# Patient Record
(demographics unavailable — no encounter records)

---

## 2024-10-22 NOTE — HISTORY OF PRESENT ILLNESS
[] : Post Surgical Visit: yes [de-identified] : 10/22/24: 3 weeks s/p left TKA no pain with walking - pain at rest.  No fevers/chills.  Previous doc: 1/23/24: 77F with longstanding hx b/l knee pain (R>L)- has been seeing Dr. Venegas for csi and HA injections for the past ~3 years- most recent b/l csi inj on 12/15. Seen by Dr. Smith on 1/12/24 and told she would benefit from TKA YASMIN- here for second opinion. Taking aleeve with some good relief. Difficulty with stairs. Ambulates with cane. Hx of arthroscopic surgery on the RT knee ~15+ years ago. 4/9/24: 2 weeks s/p right TKA, pain is better than preop.  Home PT.  No fevers/chills. 4/16/24: 3 weeks s/p L TKA. Was doing well, but had episode of increased pain while doing therapy- went to [facility] and is now being treated for cellulitis infection. Sig improvement since then. No fever and no pain in the knee with good ROM. redness and swelling has began to resolve with oral antibitoics. 4/30/24: Completed keflex.  No fevers/chills.  Still some swelling in leg.  A lot of pain after PT yesterday.  No fevers/chills. 5/28/24: 2 months s/p right TKA - had pain with bike at PT last week, otherwise not much pain daily. 6/28/24: 3 months postop min pain, occasional stiffness.  Left knee had worsening pain and had inj last week with relief. 7/26/24: Fell 10 days ago on both knees getting out of swimming pool, prior to this had min pain.   [FreeTextEntry5] : slight swelling.  [de-identified] : hep  [de-identified] : 10/02/24 [de-identified] : RT TKA YASMIN

## 2024-10-22 NOTE — IMAGING
[Bilateral] : knee bilaterally [AP] : anteroposterior [Lateral] : lateral [[place]] : skyline [advanced tricompartmental OA with medial compartment narrowing and varus alignment] : advanced tricompartmental OA with medial compartment narrowing and varus alignment [Components well fixed, in good position] : Components well fixed, in good position

## 2024-10-22 NOTE — DISCUSSION/SUMMARY
[de-identified] : The patient is doing well at this time. The patient will be started on a course of physical therapy. I recommended that the patient works on range of motion at home and was shown how to do this. I encouraged the patient to increase ambulation. The patient can continue to take Tylenol for occasional discomfort. The patient was advised not to do any dental work for the first three months following the surgery. We will see the patient  back for a follow-up for a repeat evaluation. The patient  will call or return earlier for any questions or concerns.  Signs and symptoms of infection reviewed and patient advised to call immediately for redness, fevers, and/or chills.  I saw the patient under the supervision of Dr. Mandujano and followed his plan of care.

## 2024-10-22 NOTE — PHYSICAL EXAM
[Left] : left knee [AP] : anteroposterior [Lateral] : lateral [[place]] : skyline [Components well fixed, in good position] : Components well fixed, in good position [de-identified] : Left knee: inc c/d/i.  ROM 3-95.  Lig stable.  NVI.  Cane.  Right knee: Inc healed.  ROM 0-100.

## 2024-10-22 NOTE — ASSESSMENT
[FreeTextEntry1] : Previous doc: 1/23/24: Pt with adv b/l knee OA. She has exhausted conservative tx options including meds and injections. She is well informed and would like to proceed with R TKA [facility]. She will obtain CT scan [facility] protocol- already has CT appt next week. Answered all pt questions.  4/9/24: 2 weeks postop doing well.  Cont PT.   Preop ROM 0-110. 4/16/24: 3 weeks s/p R TKA. Knee itself is doing well and she has good ROM. Redness and swelling resolving with oral antibiotics- though she is only on day 2. Will begin outpatient PT in a week and continue with antibiotics. Follow up 2 weeks to re eval after she finishes course of antibiotics.  4/30/24: Cellulitis resolved.  No signs of knee infection.  Overall good function of knee.  Some pain after a lot of use at PT yesterday.  Cont PT/HEP, return in 4 weeks.  No issues seen at this time.  She is considering left TKA and will reeval this at next visit. 5/28/24: 2 months postop doing well, some discomfort normal for course.  Cont PT, return in 4 weeks.  Considering left TKA end of the year, will call us when she wants to go forward with this. 6/28/24: 3 months postop min pain, cont HEP.  Left knee had recent inj with another doctor which is doing ok for now but she is considering left TKA in the new year.  7/26/24: Recetn fall - contuision to right knee, no acute injury seen.  Planning for left TKA 10/2/24.  Tramadol and mobic prn for now, too close to surgery for injections at this point.

## 2024-10-31 NOTE — HISTORY OF PRESENT ILLNESS
[Sudden] : sudden [8] : 8 [3] : 3 [Sharp] : sharp [Frequent] : frequent [Meds] : meds [de-identified] : This is Ms. PIPE ARMENTA  a 78 year old female who comes in today complaining of left shoulder pain for a few days with no injury.   h/o adhesive cap - 2020 -tx with csi  [] : no

## 2024-10-31 NOTE — DISCUSSION/SUMMARY
[de-identified] : IAC today L shoulder  PT rx  fu 6-8wks   ----------------------------------------------------------------------------   All relevant imaging studies pertinent to today's visit, including x-rays, MRI's and/or other advanced imaging studies (CT/etc) were independently interpreted and reviewed with the patient as needed. Implications of the studies together with the patient's clinical picture were discussed to formulate a working diagnosis and management options were detailed.   The patient and/or guardian was advised of the diagnosis.  The natural history of the pathology was explained in full. All questions were answered.  The risks and benefits of conservative and interventional treatment alternatives were explained to the patient   The patient and/or guardian was advised if any advanced diagnostic/imaging study (MRI/CT/etc) is ordered to evaluate potential pathology in the affected area(s), they should follow up in the office to review the results of the study and determine further management that may be indicated.   ------------------------------------------------  Large joint corticosteroid injection given: Left shoulder intraarticular  Patient indicated for injection after trial of rest, OTC medications including aspirin, Ibuprofen, Aleve etc or prescription NSAIDS, and/or exercises at home and/ or physical therapy without satisfactory response. Patient has symptoms including pain, swelling, and/or decreased mobility in the joint. The risks, benefits, and alternatives to corticosteroid injection were explained in full to the patient, including but not limited to infection, sepsis, bleeding, scarring, skin discoloration, temporary increase in pain, syncopal episode, failure to resolve symptoms, allergic reaction, symptom recurrence, and elevation of blood sugar in diabetics. Patient understood the risks. All questions were answered. After discussion of options, patient requested an injection.   Oral informed consent was obtained and sterile technique was utilized for the procedure including the preparation of the solutions used for the injection and betadine followed by alcohol prep to the injection site. Anesthesia was given with ethyl chloride sprayed topically. The injection was delivered. Patient tolerated the procedure well.   Post Procedure Instructions: Patient was advised to call if redness, pain, or fever occur and apply ice for 15 min on and 15 min off later today  Medications delivered: Kenalog 10 mg, Lidocaine: 4 cc per joint

## 2024-10-31 NOTE — IMAGING
[Left] : left shoulder [AC Joint Arthrosis] : AC Joint Arthrosis [Glenohumeral arthritis] : Glenohumeral arthritis [There are no fractures, subluxations or dislocations. No significant abnormalities are seen] : There are no fractures, subluxations or dislocations. No significant abnormalities are seen [Type 2 acromion] : Type 2 acromion [de-identified] :   ----------------------------------------------------------------------------   Left shoulder exam:    Skin: no significant pertinent finding  Inspection: no obvious deformity, no obvious masses, no swelling, no effusion, no atrophy  ROM:     FF: 130     ER: 15     IR: psis  Tenderness:     (+) Anterior/Biceps:     (neg) Posterior     (neg) Lateral     (neg) Trapezius     (neg) Scapula     (neg) AC joint     (neg) Crepitus with ROM  Stability:     (neg) Translation     (neg) Apprehension     (neg) Clicking  Additional tests:     (+) Neer's     (+)Hawkin's     (neg) Walker's     (neg) Speed     (neg) Cross chest adduction  Strength:     FF: 5-/5     ER: 5-/5     IR: 5/5     Biceps: 5/5     Triceps: 5/5     Distal: 5/5  Neuro: In tact to light touch throughout  Vascularity: Extremity warm and well perfused

## 2024-11-07 NOTE — DISCUSSION/SUMMARY
[Medication Risks Reviewed] : Medication risks reviewed [de-identified] : Plan for L shoulder IAC CSI with U/S guidance given last inj likely missed  ----------------------------------------------------------------------------   All relevant imaging studies pertinent to today's visit, including x-rays, MRI's and/or other advanced imaging studies (CT/etc) were independently interpreted and reviewed with the patient as needed. Implications of the studies together with the patient's clinical picture were discussed to formulate a working diagnosis and management options were detailed.   The patient and/or guardian was advised of the diagnosis.  The natural history of the pathology was explained in full. All questions were answered.  The risks and benefits of conservative and interventional treatment alternatives were explained to the patient   The patient and/or guardian was advised if any advanced diagnostic/imaging study (MRI/CT/etc) is ordered to evaluate potential pathology in the affected area(s), they should follow up in the office to review the results of the study and determine further management that may be indicated.   ------------------------------------------------ Large joint corticosteroid injection given: Left shoulder intraarticular  Patient indicated for injection after trial of rest, OTC medications including aspirin, Ibuprofen, Aleve etc or prescription NSAIDS, and/or exercises at home and/ or physical therapy without satisfactory response. Patient has symptoms including pain, swelling, and/or decreased mobility in the joint. The risks, benefits, and alternatives to corticosteroid injection were explained in full to the patient, including but not limited to infection, sepsis, bleeding, scarring, skin discoloration, temporary increase in pain, syncopal episode, failure to resolve symptoms, allergic reaction, symptom recurrence, and elevation of blood sugar in diabetics. Patient understood the risks. All questions were answered. After discussion of options, patient requested an injection.  Oral informed consent was obtained and sterile technique was utilized for the procedure including the preparation of the solutions used for the injection and betadine followed by alcohol prep to the injection site. Anesthesia was given with ethyl chloride sprayed topically. The injection was delivered. Patient tolerated the procedure well.  Post Procedure Instructions: Patient was advised to call if redness, pain, or fever occur and apply ice for 15 min on and 15 min off later today  Medications delivered: Kenalog 10 mg, Lidocaine: 4 cc per joint.

## 2024-11-07 NOTE — IMAGING
[Left] : left shoulder [AC Joint Arthrosis] : AC Joint Arthrosis [Glenohumeral arthritis] : Glenohumeral arthritis [There are no fractures, subluxations or dislocations. No significant abnormalities are seen] : There are no fractures, subluxations or dislocations. No significant abnormalities are seen [Type 2 acromion] : Type 2 acromion [de-identified] :   ----------------------------------------------------------------------------   Left shoulder exam:    Skin: no significant pertinent finding  Inspection: no obvious deformity, no obvious masses, no swelling, no effusion, no atrophy  ROM:     FF: 130     ER: 15     IR: psis  Tenderness:     (+) Anterior/Biceps:     (+) Posterior     (neg) Lateral     (neg) Trapezius     (neg) Scapula     (+) AC joint     (neg) Crepitus with ROM  Stability:     (neg) Translation     (neg) Apprehension     (neg) Clicking  Additional tests:     (+) Neer's     (+)Hawkin's     (neg) Walker's     (neg) Speed     (+) Cross chest adduction  Strength:     FF: 5-/5     ER: 5-/5     IR: 5/5     Biceps: 5/5     Triceps: 5/5     Distal: 5/5  Neuro: In tact to light touch throughout  Vascularity: Extremity warm and well perfused

## 2024-11-07 NOTE — HISTORY OF PRESENT ILLNESS
[Sudden] : sudden [6] : 6 [Sharp] : sharp [Frequent] : frequent [Meds] : meds [de-identified] : This is Ms. PIPE ARMENTA  a 78 year old female who comes in today complaining of left shoulder pain for a few days with no injury.   h/o adhesive cap - 2020 -tx with csi   h/o bL TKA [place]  [] : no [de-identified] : physical therapy

## 2024-11-07 NOTE — REASON FOR VISIT
[FreeTextEntry2] : follow up left shoulder- pt expressed min to no relief from prev L shoulder CSI inj

## 2024-11-19 NOTE — PHYSICAL EXAM
[Left] : left knee [AP] : anteroposterior [Lateral] : lateral [[place]] : skyline [Components well fixed, in good position] : Components well fixed, in good position [de-identified] : Left knee: inc c/d/i.  ROM 3-105.  Lig stable.  NVI.  Cane.  Right knee: Inc healed.  ROM 0-100.

## 2024-11-19 NOTE — ASSESSMENT
[FreeTextEntry1] : Previous doc: 1/23/24: Pt with adv b/l knee OA. She has exhausted conservative tx options including meds and injections. She is well informed and would like to proceed with R TKA [facility]. She will obtain CT scan [facility] protocol- already has CT appt next week. Answered all pt questions.  4/9/24: 2 weeks postop doing well.  Cont PT.   Preop ROM 0-110. 4/16/24: 3 weeks s/p R TKA. Knee itself is doing well and she has good ROM. Redness and swelling resolving with oral antibiotics- though she is only on day 2. Will begin outpatient PT in a week and continue with antibiotics. Follow up 2 weeks to re eval after she finishes course of antibiotics.  4/30/24: Cellulitis resolved.  No signs of knee infection.  Overall good function of knee.  Some pain after a lot of use at PT yesterday.  Cont PT/HEP, return in 4 weeks.  No issues seen at this time.  She is considering left TKA and will reeval this at next visit. 5/28/24: 2 months postop doing well, some discomfort normal for course.  Cont PT, return in 4 weeks.  Considering left TKA end of the year, will call us when she wants to go forward with this. 6/28/24: 3 months postop min pain, cont HEP.  Left knee had recent inj with another doctor which is doing ok for now but she is considering left TKA in the new year. 7/26/24: Recetn fall - contuision to right knee, no acute injury seen.  Planning for left TKA 10/2/24.  Tramadol and mobic prn for now, too close to surgery for injections at this point.  11/19/24: 6 weeks s/p left TKA min pain and good function.  She is very happy with outcome.  Cont PT/HEP, return at 1 year postop.

## 2024-11-19 NOTE — DISCUSSION/SUMMARY
[de-identified] : The patient was advised of the diagnosis.  The natural history of the pathology was explained in full to the patient in layman's terms. All questions were answered.  The risks and benefits of surgical and non-surgical treatment alternatives were explained in full to the patient.  I saw the patient under the supervision of Dr. Mandujano and followed his plan of care.

## 2024-11-19 NOTE — HISTORY OF PRESENT ILLNESS
[] : Post Surgical Visit: yes [Dull/Aching] : dull/aching [de-identified] : 11/19/24: 6 weeks postop min pain, going to PT.  Difficulty sleeping otherwise no complaints.  Previous doc: 1/23/24: 77F with longstanding hx b/l knee pain (R>L)- has been seeing Dr. Venegas for csi and HA injections for the past ~3 years- most recent b/l csi inj on 12/15. Seen by Dr. Smith on 1/12/24 and told she would benefit from TKA YASMIN- here for second opinion. Taking aleeve with some good relief. Difficulty with stairs. Ambulates with cane. Hx of arthroscopic surgery on the RT knee ~15+ years ago. 4/9/24: 2 weeks s/p right TKA, pain is better than preop.  Home PT.  No fevers/chills. 4/16/24: 3 weeks s/p L TKA. Was doing well, but had episode of increased pain while doing therapy- went to [facility] and is now being treated for cellulitis infection. Sig improvement since then. No fever and no pain in the knee with good ROM. redness and swelling has began to resolve with oral antibitoics. 4/30/24: Completed keflex.  No fevers/chills.  Still some swelling in leg.  A lot of pain after PT yesterday.  No fevers/chills. 5/28/24: 2 months s/p right TKA - had pain with bike at PT last week, otherwise not much pain daily. 6/28/24: 3 months postop min pain, occasional stiffness.  Left knee had worsening pain and had inj last week with relief. 7/26/24: Fell 10 days ago on both knees getting out of swimming pool, prior to this had min pain.   10/22/24: 3 weeks s/p left TKA no pain with walking - pain at rest.  No fevers/chills. [FreeTextEntry5] : interferes while sleeping  [de-identified] : PT  [de-identified] : 10/02/24 [de-identified] : left knee demetrio

## 2024-12-04 NOTE — ASSESSMENT
[FreeTextEntry1] : L GH DJD, mild cervical DDD.  Xrays and advanced imaging reviewed. Discussed op versus non op tx, including the r/b/a/c of both. 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 Discussed timing, frequency and efficacy of cortisone injections. Discussed risks of repeated cortisone injections.  Discussed trial of visco supplementation. PAOLA [facility] CSI on 11/11/24 with dr dwyer - reports to no relief.  Diclofenac 75mg BID prn pain.  PT - if painful, she will stop but she reports to improvement thus far.  Orthovisc #1 L shoulder tolerated well. RTO in 1 week.    Procedure Note: Viscosupplementation Injection: X-ray evidence of Osteoarthritis on this or prior visit and Patient has tried OTC's including aspirin, Ibuprofen, Aleve etc or prescription NSAIDs, and/or exercises at home and/ or physical therapy without satisfactory response.   An injection of Orthovisc 2ml was injected into the left shoulder. The risks, benefits, and alternatives to Viscosupplementation injection were explained in full to the patient. Risks outlined include but are not limited to infection, sepsis, bleeding, scarring, skin discoloration, temporary increase in pain, syncopal episode, failure to resolve symptoms, allergic reaction, and symptom recurrence. Signs and symptoms of infection reviewed and patient advised to call immediately for redness, fevers, and/or chills. Patient understood the risks. All questions were answered. After discussion of options, patient requested Viscosupplementation. Oral informed consent was obtained and sterile prep of the injection site was performed using alcohol. Sterile technique was utilized for the procedure including the preparation of the solutions used for the injection. Ethyl chloride spray was used topically.  Sterile technique used. Patient tolerated procedure well. Post Procedure Instructions: Patient was advised to call if redness, pain, or fever occur and apply ice for 15 min. out of every hour for the next 12-24 hours as tolerated. patient was advised to rest the joint(s) for 2 days.  Ultrasound Guidance was used for the following reasons: for Glenohumeral injection.  Ultrasound guided injection was performed of the shoulder, visualization of the needle and placement of injection was performed without complication.

## 2024-12-04 NOTE — HISTORY OF PRESENT ILLNESS
June 14, 2022        Bryn Harkins  [address] [address]        Dear Bryn Harkins:      Our records indicate that you did not present for your appointments on 06/13/2022 with Dr. Roca and Dr. Galan.      As you failed to present on both 06/13/2022 and 05/19/2022, per our office policy we will no longer pursue you for further scheduling. If you have any questions or would like to reschedule, please contact [phone].        Sincerely,         Cheryl PALACIOS  Clinical    [facility]   [facility]      
[de-identified] : 12/04/24:  77 y/o RHD female here for left shoulder, states she been having pain in the shoulder increasing for 1-2 months.  No new injury or trauma but does report a fall approx 4 years ago.  states had a XR / CSI injection with manaqib  on 11/7/24.   Pmhx - denies

## 2024-12-04 NOTE — IMAGING
[Disc space narrowing] : Disc space narrowing [Left] : left shoulder [Glenohumeral arthritis] : Glenohumeral arthritis

## 2024-12-11 NOTE — HISTORY OF PRESENT ILLNESS
[de-identified] : 12/11/24 - injection # 2 of orthovisc  12/04/24:  77 y/o RHD female here for left shoulder, states she been having pain in the shoulder increasing for 1-2 months.  No new injury or trauma but does report a fall approx 4 years ago.  states had a XR / CSI injection with manaqib  on 11/7/24.   Pmhx - denies

## 2024-12-11 NOTE — ASSESSMENT
[FreeTextEntry1] : L GH DJD, mild cervical DDD.  Xrays and advanced imaging reviewed. Discussed op versus non op tx, including the r/b/a/c of both. 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 Discussed timing, frequency and efficacy of cortisone injections. Discussed risks of repeated cortisone injections.  Discussed trial of visco supplementation. PAOLA [facility] CSI on 11/11/24 with dr dwyer - reports to no relief.  Diclofenac 75mg BID prn pain.  PT - if painful, she will stop but she reports to improvement thus far.  Orthovisc #2 L shoulder tolerated well. RTO in 1 week.    Procedure Note: Viscosupplementation Injection: X-ray evidence of Osteoarthritis on this or prior visit and Patient has tried OTC's including aspirin, Ibuprofen, Aleve etc or prescription NSAIDs, and/or exercises at home and/ or physical therapy without satisfactory response.   An injection of Orthovisc 2ml was injected into the left shoulder. The risks, benefits, and alternatives to Viscosupplementation injection were explained in full to the patient. Risks outlined include but are not limited to infection, sepsis, bleeding, scarring, skin discoloration, temporary increase in pain, syncopal episode, failure to resolve symptoms, allergic reaction, and symptom recurrence. Signs and symptoms of infection reviewed and patient advised to call immediately for redness, fevers, and/or chills. Patient understood the risks. All questions were answered. After discussion of options, patient requested Viscosupplementation. Oral informed consent was obtained and sterile prep of the injection site was performed using alcohol. Sterile technique was utilized for the procedure including the preparation of the solutions used for the injection. Ethyl chloride spray was used topically.  Sterile technique used. Patient tolerated procedure well. Post Procedure Instructions: Patient was advised to call if redness, pain, or fever occur and apply ice for 15 min. out of every hour for the next 12-24 hours as tolerated. patient was advised to rest the joint(s) for 2 days.  Ultrasound Guidance was used for the following reasons: for Glenohumeral injection.  Ultrasound guided injection was performed of the shoulder, visualization of the needle and placement of injection was performed without complication.

## 2024-12-18 NOTE — HISTORY OF PRESENT ILLNESS
[de-identified] : 12/18/24- Injection #3   12/11/24 - injection # 2 of orthovisc  12/04/24:  79 y/o RHD female here for left shoulder, states she been having pain in the shoulder increasing for 1-2 months.  No new injury or trauma but does report a fall approx 4 years ago.  states had a XR / CSI injection with manaqib  on 11/7/24.   Pmhx - denies

## 2024-12-18 NOTE — ASSESSMENT
[FreeTextEntry1] : L GH DJD, mild cervical DDD.  Xrays and advanced imaging reviewed. Discussed op versus non op tx, including the r/b/a/c of both. 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 Discussed timing, frequency and efficacy of cortisone injections. Discussed risks of repeated cortisone injections.  Discussed trial of visco supplementation. [facility] CSI on 11/11/24 with dr dwyer - reports to no relief.  Will try Naprosyn 500mg BID prn pain.  PT - if painful, she will stop but she reports to improvement thus far.  Orthovisc #3 L shoulder tolerated well. RTO in 1 week.    Procedure Note: Viscosupplementation Injection: X-ray evidence of Osteoarthritis on this or prior visit and Patient has tried OTC's including aspirin, Ibuprofen, Aleve etc or prescription NSAIDs, and/or exercises at home and/ or physical therapy without satisfactory response.   An injection of Orthovisc 2ml was injected into the left shoulder. The risks, benefits, and alternatives to Viscosupplementation injection were explained in full to the patient. Risks outlined include but are not limited to infection, sepsis, bleeding, scarring, skin discoloration, temporary increase in pain, syncopal episode, failure to resolve symptoms, allergic reaction, and symptom recurrence. Signs and symptoms of infection reviewed and patient advised to call immediately for redness, fevers, and/or chills. Patient understood the risks. All questions were answered. After discussion of options, patient requested Viscosupplementation. Oral informed consent was obtained and sterile prep of the injection site was performed using alcohol. Sterile technique was utilized for the procedure including the preparation of the solutions used for the injection. Ethyl chloride spray was used topically.  Sterile technique used. Patient tolerated procedure well. Post Procedure Instructions: Patient was advised to call if redness, pain, or fever occur and apply ice for 15 min. out of every hour for the next 12-24 hours as tolerated. patient was advised to rest the joint(s) for 2 days.  Ultrasound Guidance was used for the following reasons: for Glenohumeral injection.  Ultrasound guided injection was performed of the shoulder, visualization of the needle and placement of injection was performed without complication.

## 2025-01-03 NOTE — ASSESSMENT
[FreeTextEntry1] : L GH DJD, mild cervical DDD.  [facility] CSI on 11/11/24 with dr dwyer - reports to no relief.  Naprosyn 500mg BID prn pain.  PT - if painful, she will stop but she reports to improvement thus far.  Orthovisc #4 L shoulder tolerated well. RTO in 6 weeks.   Procedure Note: Viscosupplementation Injection: X-ray evidence of Osteoarthritis on this or prior visit and Patient has tried OTC's including aspirin, Ibuprofen, Aleve etc or prescription NSAIDs, and/or exercises at home and/ or physical therapy without satisfactory response.   An injection of Orthovisc 2ml was injected into the left shoulder. The risks, benefits, and alternatives to Viscosupplementation injection were explained in full to the patient. Risks outlined include but are not limited to infection, sepsis, bleeding, scarring, skin discoloration, temporary increase in pain, syncopal episode, failure to resolve symptoms, allergic reaction, and symptom recurrence. Signs and symptoms of infection reviewed and patient advised to call immediately for redness, fevers, and/or chills. Patient understood the risks. All questions were answered. After discussion of options, patient requested Viscosupplementation. Oral informed consent was obtained and sterile prep of the injection site was performed using alcohol. Sterile technique was utilized for the procedure including the preparation of the solutions used for the injection. Ethyl chloride spray was used topically.  Sterile technique used. Patient tolerated procedure well. Post Procedure Instructions: Patient was advised to call if redness, pain, or fever occur and apply ice for 15 min. out of every hour for the next 12-24 hours as tolerated. patient was advised to rest the joint(s) for 2 days.  Ultrasound Guidance was used for the following reasons: for Glenohumeral injection.  Ultrasound guided injection was performed of the shoulder, visualization of the needle and placement of injection was performed without complication.

## 2025-01-03 NOTE — HISTORY OF PRESENT ILLNESS
[de-identified] : 1/3/25: Pt is here for Orthovisc #4.  She is in PT and wants to continue.   12/18/24- Injection #3   12/11/24 - injection # 2 of orthovisc  12/04/24:  77 y/o RHD female here for left shoulder, states she been having pain in the shoulder increasing for 1-2 months.  No new injury or trauma but does report a fall approx 4 years ago.  states had a XR / CSI injection with manaqib  on 11/7/24.   Pmhx - denies

## 2025-04-11 NOTE — HISTORY OF PRESENT ILLNESS
[de-identified] : 4/11/25: Here for follow up. She states orthovisc helped about 20%. She started massage therapy with some relief.   She feels her pain has gotten worse.   1/3/25: Pt is here for Orthovisc #4.  She is in PT and wants to continue.   12/18/24- Injection #3   12/11/24 - injection # 2 of orthovisc  12/04/24:  77 y/o RHD female here for left shoulder, states she been having pain in the shoulder increasing for 1-2 months.  No new injury or trauma but does report a fall approx 4 years ago.  states had a XR / CSI injection with manaqib  on 11/7/24.   Pmhx - denies [FreeTextEntry5] : PIPE is here today for LEFT shoulder follow up. reports no improvement in pain. requesting CSI today.

## 2025-04-11 NOTE — ASSESSMENT
[FreeTextEntry1] : L GH DJD, mild cervical DDD.  [facility] CSI on 11/11/24 with dr castillo - reports to no relief. She does massage therapy.  Orthovisc completed 1/3/25, 30% relief. She alternates between Diclofenac 75mg BID and Naprosyn 500mg BID prn pain.  She also takes tramadol and Tylenol. Advised she will need to get rx for tramadol from pain mgt for chronic pain. She had questions about alternative treatments like PRP, stem cells and US, which I do not believe will provide significant relief. L GH injection given today. RTO prn.  Procedure Note: Large Joint Injection was performed because of pain and inflammation, failure of conservative treatment.   Medications: Depo-Medrol: 1 cc, 80 mg. Lidocaine: 2 cc, 1%.  Marcaine: 2 cc, .25%.   Medication was injected in the left glenohumeral joint. Patient has tried OTC's including aspirin, Ibuprofen, Aleve etc or prescription NSAIDs, and/or exercises at home and/ or physical therapy without satisfactory response. The risks, benefits, and alternatives to cortisone injection were explained in full to the patient. Risks outlined include but are not limited to infection, sepsis, bleeding, scarring, skin discoloration, temporary increase in pain, syncopal episode, failure to resolve symptoms, allergic reaction, symptom recurrence, and elevation of blood sugar in diabetics. Patient understood the risks. All questions were answered. After discussion of options, patient requested an injection. Oral informed consent was obtained and sterile prep of the injection site was performed using alcohol. Sterile technique was utilized for the procedure including the preparation of the solutions used for the injection. Ethyl chloride spray was used topically.  Sterile technique used. Patient tolerated procedure well. Post Procedure Instructions: Patient was advised to call if redness, pain, or fever occur and apply ice for 15 min. out of every hour for the next 12-24 hours as tolerated. patient was advised to rest the joint(s) for 2 days.  Ultrasound Guidance was used for the following reasons: for Glenohumeral injection.  Ultrasound guided injection was performed of the shoulder, visualization of the needle and placement of injection was performed without complication.

## 2025-07-18 NOTE — ASSESSMENT
[FreeTextEntry1] : L GH DJD, mild cervical DDD.  The patient is indicated for shoulder replacement. Discussed op versus non op tx, including the r/b/a/c of both. 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 She would need a CT scan with 3D recons.  She does massage therapy.  Orthovisc completed 1/3/25, 30% relief. She alternates between Diclofenac 75mg BID and Naprosyn 500mg BID prn pain. - will renew today  She also takes tramadol and Tylenol. Advised she will need to get rx for tramadol from pain mgt for chronic pain. L GH injection given today. RTO prn.   Procedure Note: Large Joint Injection was performed because of pain and inflammation, failure of conservative treatment.   Medications: Depo-Medrol: 1 cc, 80 mg. Lidocaine: 2 cc, 1%.  Marcaine: 2 cc, .25%.   Medication was injected in the left glenohumeral joint. Patient has tried OTC's including aspirin, Ibuprofen, Aleve etc or prescription NSAIDs, and/or exercises at home and/ or physical therapy without satisfactory response. The risks, benefits, and alternatives to cortisone injection were explained in full to the patient. Risks outlined include but are not limited to infection, sepsis, bleeding, scarring, skin discoloration, temporary increase in pain, syncopal episode, failure to resolve symptoms, allergic reaction, symptom recurrence, and elevation of blood sugar in diabetics. Patient understood the risks. All questions were answered. After discussion of options, patient requested an injection. Oral informed consent was obtained and sterile prep of the injection site was performed using alcohol. Sterile technique was utilized for the procedure including the preparation of the solutions used for the injection. Ethyl chloride spray was used topically.  Sterile technique used. Patient tolerated procedure well. Post Procedure Instructions: Patient was advised to call if redness, pain, or fever occur and apply ice for 15 min. out of every hour for the next 12-24 hours as tolerated. patient was advised to rest the joint(s) for 2 days.  Ultrasound Guidance was used for the following reasons: for Glenohumeral injection.  Ultrasound guided injection was performed of the shoulder, visualization of the needle and placement of injection was performed without complication.

## 2025-07-18 NOTE — HISTORY OF PRESENT ILLNESS
[de-identified] : 07/18/2025: She is known to have left shoulder oa. Does will with episodic csi last done 4/11/25 and orthovisc last completed 1/3/25. recently pain has returned she is requesting csi and diclofenac rx renewed.  She has some questions about surgery.   4/11/25: Here for follow up. She states orthovisc helped about 20%. She started massage therapy with some relief.   She feels her pain has gotten worse.   1/3/25: Pt is here for Orthovisc #4.  She is in PT and wants to continue.   12/18/24- Injection #3   12/11/24 - injection # 2 of orthovisc  12/04/24:  79 y/o RHD female here for left shoulder, states she been having pain in the shoulder increasing for 1-2 months.  No new injury or trauma but does report a fall approx 4 years ago.  states had a XR / CSI injection with manaqib  on 11/7/24.   Pmhx - denies  [FreeTextEntry5] : PIPE is here today for LEFT shoulder follow up. reports no improvement in pain. requesting CSI today.